# Patient Record
Sex: MALE | Race: WHITE | NOT HISPANIC OR LATINO | URBAN - METROPOLITAN AREA
[De-identification: names, ages, dates, MRNs, and addresses within clinical notes are randomized per-mention and may not be internally consistent; named-entity substitution may affect disease eponyms.]

---

## 2017-01-24 ENCOUNTER — HOSPITAL ENCOUNTER (OUTPATIENT)
Dept: RADIOLOGY | Facility: HOSPITAL | Age: 27
Discharge: HOME | End: 2017-01-24
Attending: RADIOLOGY

## 2017-01-24 DIAGNOSIS — Z76.89 REFERRAL OF PATIENT WITHOUT EXAMINATION OR TREATMENT: ICD-10-CM

## 2023-02-20 ENCOUNTER — OFFICE VISIT (OUTPATIENT)
Dept: FAMILY MEDICINE CLINIC | Facility: CLINIC | Age: 33
End: 2023-02-20

## 2023-02-20 VITALS
HEIGHT: 74 IN | TEMPERATURE: 97.7 F | SYSTOLIC BLOOD PRESSURE: 136 MMHG | HEART RATE: 111 BPM | DIASTOLIC BLOOD PRESSURE: 86 MMHG | WEIGHT: 173.6 LBS | OXYGEN SATURATION: 98 % | BODY MASS INDEX: 22.28 KG/M2

## 2023-02-20 DIAGNOSIS — F90.9 ATTENTION DEFICIT HYPERACTIVITY DISORDER (ADHD), UNSPECIFIED ADHD TYPE: ICD-10-CM

## 2023-02-20 DIAGNOSIS — Z00.00 WELL ADULT EXAM: Primary | ICD-10-CM

## 2023-02-20 RX ORDER — DEXTROAMPHETAMINE SACCHARATE, AMPHETAMINE ASPARTATE, DEXTROAMPHETAMINE SULFATE AND AMPHETAMINE SULFATE 2.5; 2.5; 2.5; 2.5 MG/1; MG/1; MG/1; MG/1
10 TABLET ORAL DAILY PRN
COMMUNITY

## 2023-02-20 RX ORDER — DEXTROAMPHETAMINE SACCHARATE, AMPHETAMINE ASPARTATE MONOHYDRATE, DEXTROAMPHETAMINE SULFATE AND AMPHETAMINE SULFATE 7.5; 7.5; 7.5; 7.5 MG/1; MG/1; MG/1; MG/1
30 CAPSULE, EXTENDED RELEASE ORAL EVERY MORNING
COMMUNITY

## 2023-02-20 NOTE — PROGRESS NOTES
Assessment/Plan: Recommend good diet and regular exercise  1  Well adult exam    2  Attention deficit hyperactivity disorder (ADHD), unspecified ADHD type  Assessment & Plan:  Patient is unable to get Adderall because of supply chain issues  He is home from [place] and we will switch to Vyvanse  Recommend follow-up with psychiatrist     Orders:  -     lisdexamfetamine (Vyvanse) 30 MG capsule; Take 1 capsule (30 mg total) by mouth every morning Max Daily Amount: 30 mg        Subjective:      Patient ID: Cierra Zayas is a 35 y o  male  Patient is here for first-time well check  He does see a psychiatrist in [place] where he lives  They are having a difficult time getting Adderall due to supply chain issues  He has been stable on this medication for a while  He otherwise generally feels okay  The following portions of the patient's history were reviewed and updated as appropriate: allergies, current medications, past family history, past medical history, past social history, past surgical history, and problem list     Review of Systems   Constitutional: Negative  HENT: Negative  Eyes: Negative  Respiratory: Negative  Cardiovascular: Negative  Gastrointestinal: Negative  Endocrine: Negative  Genitourinary: Negative  Musculoskeletal: Negative  Skin: Negative  Allergic/Immunologic: Negative  Neurological: Negative  Hematological: Negative  Psychiatric/Behavioral: Negative  Objective:      /86 (BP Location: Left arm, Patient Position: Sitting, Cuff Size: Standard)   Pulse (!) 111   Temp 97 7 °F (36 5 °C) (Tympanic)   Ht 6' 2" (1 88 m)   Wt 78 7 kg (173 lb 9 6 oz)   SpO2 98%   BMI 22 29 kg/m²          Physical Exam  Vitals reviewed  Constitutional:       Appearance: He is well-developed  HENT:      Head: Normocephalic and atraumatic  Right Ear: External ear normal  Tympanic membrane is not erythematous or bulging        Left Ear: External ear normal  Tympanic membrane is not erythematous or bulging  Nose: Nose normal       Mouth/Throat:      Mouth: No oral lesions  Pharynx: No oropharyngeal exudate  Eyes:      General: No scleral icterus  Right eye: No discharge  Left eye: No discharge  Conjunctiva/sclera: Conjunctivae normal    Neck:      Thyroid: No thyromegaly  Cardiovascular:      Rate and Rhythm: Normal rate and regular rhythm  Heart sounds: Normal heart sounds  No murmur heard  No friction rub  No gallop  Pulmonary:      Effort: Pulmonary effort is normal  No respiratory distress  Breath sounds: No wheezing or rales  Chest:      Chest wall: No tenderness  Abdominal:      General: Bowel sounds are normal  There is no distension  Palpations: Abdomen is soft  There is no mass  Tenderness: There is no abdominal tenderness  There is no guarding or rebound  Musculoskeletal:         General: No tenderness or deformity  Normal range of motion  Cervical back: Normal range of motion and neck supple  Lymphadenopathy:      Cervical: No cervical adenopathy  Skin:     General: Skin is warm and dry  Coloration: Skin is not pale  Findings: No erythema or rash  Neurological:      Mental Status: He is alert and oriented to person, place, and time  Cranial Nerves: No cranial nerve deficit  Motor: No abnormal muscle tone  Coordination: Coordination normal       Deep Tendon Reflexes: Reflexes are normal and symmetric     Psychiatric:         Behavior: Behavior normal

## 2023-02-20 NOTE — ASSESSMENT & PLAN NOTE
Patient is unable to get Adderall because of supply chain issues  He is home from [place] and we will switch to Vyvanse    Recommend follow-up with psychiatrist

## 2023-02-22 ENCOUNTER — TELEPHONE (OUTPATIENT)
Dept: FAMILY MEDICINE CLINIC | Facility: CLINIC | Age: 33
End: 2023-02-22

## 2023-02-22 NOTE — TELEPHONE ENCOUNTER
Received a call from the patient's mother giving us medications that would be cheaper for lisdexamfetamine, per the insurance  Medications the insurance is recommending are:     Dexmethylphenidate ER  Methylphenidate ER  Adderall XR (which is on backorder)    They would like one of these ordered for his next refill, if possible

## 2023-03-14 DIAGNOSIS — F90.9 ATTENTION DEFICIT HYPERACTIVITY DISORDER (ADHD), UNSPECIFIED ADHD TYPE: ICD-10-CM

## 2023-03-14 NOTE — TELEPHONE ENCOUNTER
Failed protocol    Psychiatry:  Stimulants/ADHD Failed 03/14/2023 04:26 PM    This refill cannot be delegated    Valid encounter within last 6 months

## 2023-04-12 DIAGNOSIS — F90.9 ATTENTION DEFICIT HYPERACTIVITY DISORDER (ADHD), UNSPECIFIED ADHD TYPE: ICD-10-CM

## 2023-04-12 RX ORDER — LISDEXAMFETAMINE DIMESYLATE CAPSULES 30 MG/1
30 CAPSULE ORAL EVERY MORNING
Qty: 60 CAPSULE | Refills: 0 | Status: SHIPPED | OUTPATIENT
Start: 2023-04-12 | End: 2023-06-13 | Stop reason: SDUPTHER

## 2023-05-17 ENCOUNTER — TELEPHONE (OUTPATIENT)
Dept: PSYCHIATRY | Facility: CLINIC | Age: 33
End: 2023-05-17

## 2023-05-17 NOTE — TELEPHONE ENCOUNTER
Patient has been added to the Medication Management wait list without a referral     Insurance: blue cross  Insurance Type:    Commercial [x]   Medicaid []   [place] (if applicable)   Medicare []  Location Preference: any  Provider Preference: any  Virtual: Yes [x] No []

## 2023-06-13 DIAGNOSIS — F90.9 ATTENTION DEFICIT HYPERACTIVITY DISORDER (ADHD), UNSPECIFIED ADHD TYPE: ICD-10-CM

## 2023-06-14 NOTE — TELEPHONE ENCOUNTER
lisdexamfetamine (Vyvanse) 30 MG capsule         Sig: Take 1 capsule (30 mg total) by mouth every morning Max Daily Amount: 30 mg    Disp:  60 capsule    Refills:  0    Start: 6/13/2023    Earliest Fill Date: 6/13/2023    Class: Normal    Non-formulary For: Attention deficit hyperactivity disorder (ADHD), unspecified ADHD type    Last ordered: 2 months ago (4/12/2023) by Huyen Brown DO    Psychiatry:  Stimulants/ADHD Failed 06/13/2023 04:13 PM   Protocol Details  This refill cannot be delegated    Valid encounter within last 6 months      To be filled at: [facility]/pharmacy #[id number] [place] [address]

## 2024-02-16 ENCOUNTER — TELEPHONE (OUTPATIENT)
Dept: PSYCHIATRY | Facility: CLINIC | Age: 34
End: 2024-02-16

## 2024-02-16 NOTE — TELEPHONE ENCOUNTER
Contacted patient using number provided by mother. Spoke w. Patient whom stated they have moved to [place] and at this time would not be able to have [place] appt.

## 2024-02-16 NOTE — TELEPHONE ENCOUNTER
Contacted patient off of NON-REFERRAL medication management to verify needs of services in attempts to offer patient an appointment at [facility] . Spoke with patient's mother whom provided patient contact number.       [phone]